# Patient Record
Sex: FEMALE | URBAN - NONMETROPOLITAN AREA
[De-identification: names, ages, dates, MRNs, and addresses within clinical notes are randomized per-mention and may not be internally consistent; named-entity substitution may affect disease eponyms.]

---

## 2020-03-19 ENCOUNTER — APPOINTMENT (RX ONLY)
Dept: URBAN - NONMETROPOLITAN AREA CLINIC 22 | Facility: CLINIC | Age: 60
Setting detail: DERMATOLOGY
End: 2020-03-19

## 2020-03-19 DIAGNOSIS — L91.0 HYPERTROPHIC SCAR: ICD-10-CM

## 2020-03-19 PROCEDURE — 99202 OFFICE O/P NEW SF 15 MIN: CPT

## 2020-03-19 PROCEDURE — ? COUNSELING

## 2020-03-19 PROCEDURE — ? PRESCRIPTION

## 2020-03-19 PROCEDURE — ? TREATMENT REGIMEN

## 2020-03-19 RX ORDER — BETAMETHASONE DIPROPIONATE 0.5 MG/G
OINTMENT TOPICAL
Qty: 1 | Refills: 2 | Status: ERX | COMMUNITY
Start: 2020-03-19

## 2020-03-19 RX ADMIN — BETAMETHASONE DIPROPIONATE: 0.5 OINTMENT TOPICAL at 00:00

## 2020-03-19 ASSESSMENT — SCAR ASSESSEMENT OVERALL: SCAR ASSESSMENT: 3 (NODULAR SCAR, NO ERYTHEMA)

## 2020-03-19 ASSESSMENT — LOCATION ZONE DERM: LOCATION ZONE: TRUNK

## 2020-03-19 ASSESSMENT — LOCATION DETAILED DESCRIPTION DERM
LOCATION DETAILED: RIGHT LATERAL ABDOMEN
LOCATION DETAILED: LEFT LATERAL ABDOMEN
LOCATION DETAILED: PERIUMBILICAL SKIN

## 2020-03-19 ASSESSMENT — LOCATION SIMPLE DESCRIPTION DERM: LOCATION SIMPLE: ABDOMEN

## 2020-03-19 NOTE — PROCEDURE: TREATMENT REGIMEN
Detail Level: Zone
Plan: Discussed diagnosis in detail with patient at this time. Informed patient that these lesions form from over healing of the skin. Discussed with patient that these lesions can be treated with injectable or topical steroids to help reduce inflammation and itching. Informed patient that laser therapy is also helpful for these lesions. Informed patient that these lasers are not available in our office and she would need to travel to [place] for these treatments. Informed patient that a topical steroid in a ointment can be prescribed. Informed patient that cream formulations have alcohol and this may be what is causing the burning. Informed patient that keloids can also be excised but, they may keloid again.

## 2020-03-19 NOTE — HPI: SCAR (KELOIDS)
How Severe Are They?: mild
Is This A New Presentation, Or A Follow-Up?: Scars
Additional History: Patient states that these lesions were treated with keloid injections X 3. Patient states that she was given a topical steroid cream but, she stopped using this after 3 -4 times as it caused burning and stinging to the site.